# Patient Record
Sex: FEMALE | Employment: FULL TIME | URBAN - METROPOLITAN AREA
[De-identification: names, ages, dates, MRNs, and addresses within clinical notes are randomized per-mention and may not be internally consistent; named-entity substitution may affect disease eponyms.]

---

## 2021-09-19 ENCOUNTER — HOSPITAL ENCOUNTER (OUTPATIENT)
Facility: CLINIC | Age: 21
Setting detail: OBSERVATION
Discharge: HOME | End: 2021-09-20
Attending: EMERGENCY MEDICINE | Admitting: FAMILY MEDICINE

## 2021-09-19 ENCOUNTER — APPOINTMENT (OUTPATIENT)
Dept: ULTRASOUND IMAGING | Facility: CLINIC | Age: 21
End: 2021-09-19
Attending: EMERGENCY MEDICINE

## 2021-09-19 DIAGNOSIS — K80.50 BILIARY COLIC: ICD-10-CM

## 2021-09-19 LAB
ALBUMIN SERPL-MCNC: 3.8 G/DL (ref 3.4–5)
ALBUMIN UR-MCNC: NEGATIVE MG/DL
ALP SERPL-CCNC: 130 U/L (ref 40–150)
ALT SERPL W P-5'-P-CCNC: 248 U/L (ref 0–50)
ANION GAP SERPL CALCULATED.3IONS-SCNC: 7 MMOL/L (ref 3–14)
APPEARANCE UR: CLEAR
AST SERPL W P-5'-P-CCNC: 224 U/L (ref 0–45)
BACTERIA #/AREA URNS HPF: ABNORMAL /HPF
BASOPHILS # BLD MANUAL: 0.1 10E3/UL (ref 0–0.2)
BASOPHILS NFR BLD MANUAL: 1 %
BILIRUB SERPL-MCNC: 1.4 MG/DL (ref 0.2–1.3)
BILIRUB UR QL STRIP: NEGATIVE
BLISTER CELLS BLD QL SMEAR: ABNORMAL
BUN SERPL-MCNC: 19 MG/DL (ref 7–30)
CALCIUM SERPL-MCNC: 8.9 MG/DL (ref 8.5–10.1)
CHLORIDE BLD-SCNC: 106 MMOL/L (ref 94–109)
CO2 SERPL-SCNC: 24 MMOL/L (ref 20–32)
COLOR UR AUTO: YELLOW
CREAT SERPL-MCNC: 0.63 MG/DL (ref 0.52–1.04)
EOSINOPHIL # BLD MANUAL: 0 10E3/UL (ref 0–0.7)
EOSINOPHIL NFR BLD MANUAL: 0 %
ERYTHROCYTE [DISTWIDTH] IN BLOOD BY AUTOMATED COUNT: 12.8 % (ref 10–15)
GFR SERPL CREATININE-BSD FRML MDRD: >90 ML/MIN/1.73M2
GLUCOSE BLD-MCNC: 73 MG/DL (ref 70–99)
GLUCOSE UR STRIP-MCNC: NEGATIVE MG/DL
HCG SERPL QL: NEGATIVE
HCT VFR BLD AUTO: 37.3 % (ref 35–47)
HGB BLD-MCNC: 12.3 G/DL (ref 11.7–15.7)
HGB UR QL STRIP: NEGATIVE
KETONES UR STRIP-MCNC: 20 MG/DL
LEUKOCYTE ESTERASE UR QL STRIP: ABNORMAL
LIPASE SERPL-CCNC: 151 U/L (ref 73–393)
LYMPHOCYTES # BLD MANUAL: 2.6 10E3/UL (ref 0.8–5.3)
LYMPHOCYTES NFR BLD MANUAL: 30 %
MCH RBC QN AUTO: 29.6 PG (ref 26.5–33)
MCHC RBC AUTO-ENTMCNC: 33 G/DL (ref 31.5–36.5)
MCV RBC AUTO: 90 FL (ref 78–100)
MONOCYTES # BLD MANUAL: 0.5 10E3/UL (ref 0–1.3)
MONOCYTES NFR BLD MANUAL: 6 %
MUCOUS THREADS #/AREA URNS LPF: PRESENT /LPF
NEUTROPHILS # BLD MANUAL: 5.4 10E3/UL (ref 1.6–8.3)
NEUTROPHILS NFR BLD MANUAL: 63 %
NITRATE UR QL: NEGATIVE
PH UR STRIP: 5.5 [PH] (ref 5–7)
PLAT MORPH BLD: ABNORMAL
PLATELET # BLD AUTO: 288 10E3/UL (ref 150–450)
POTASSIUM BLD-SCNC: 3.6 MMOL/L (ref 3.4–5.3)
PROT SERPL-MCNC: 7.6 G/DL (ref 6.8–8.8)
RBC # BLD AUTO: 4.16 10E6/UL (ref 3.8–5.2)
RBC MORPH BLD: ABNORMAL
RBC URINE: 1 /HPF
SODIUM SERPL-SCNC: 137 MMOL/L (ref 133–144)
SP GR UR STRIP: 1.03 (ref 1–1.03)
SQUAMOUS EPITHELIAL: 9 /HPF
UROBILINOGEN UR STRIP-MCNC: 12 MG/DL
WBC # BLD AUTO: 8.5 10E3/UL (ref 4–11)
WBC URINE: 1 /HPF

## 2021-09-19 PROCEDURE — 250N000011 HC RX IP 250 OP 636: Performed by: EMERGENCY MEDICINE

## 2021-09-19 PROCEDURE — 85027 COMPLETE CBC AUTOMATED: CPT | Performed by: EMERGENCY MEDICINE

## 2021-09-19 PROCEDURE — G0378 HOSPITAL OBSERVATION PER HR: HCPCS

## 2021-09-19 PROCEDURE — 81001 URINALYSIS AUTO W/SCOPE: CPT | Performed by: EMERGENCY MEDICINE

## 2021-09-19 PROCEDURE — 84703 CHORIONIC GONADOTROPIN ASSAY: CPT | Performed by: EMERGENCY MEDICINE

## 2021-09-19 PROCEDURE — U0005 INFEC AGEN DETEC AMPLI PROBE: HCPCS | Performed by: EMERGENCY MEDICINE

## 2021-09-19 PROCEDURE — 258N000003 HC RX IP 258 OP 636: Performed by: EMERGENCY MEDICINE

## 2021-09-19 PROCEDURE — 96361 HYDRATE IV INFUSION ADD-ON: CPT | Performed by: EMERGENCY MEDICINE

## 2021-09-19 PROCEDURE — 99285 EMERGENCY DEPT VISIT HI MDM: CPT | Mod: 25 | Performed by: EMERGENCY MEDICINE

## 2021-09-19 PROCEDURE — 83690 ASSAY OF LIPASE: CPT | Performed by: EMERGENCY MEDICINE

## 2021-09-19 PROCEDURE — 82040 ASSAY OF SERUM ALBUMIN: CPT | Performed by: EMERGENCY MEDICINE

## 2021-09-19 PROCEDURE — 96374 THER/PROPH/DIAG INJ IV PUSH: CPT | Performed by: EMERGENCY MEDICINE

## 2021-09-19 PROCEDURE — 36415 COLL VENOUS BLD VENIPUNCTURE: CPT | Performed by: EMERGENCY MEDICINE

## 2021-09-19 PROCEDURE — 99285 EMERGENCY DEPT VISIT HI MDM: CPT | Performed by: EMERGENCY MEDICINE

## 2021-09-19 PROCEDURE — 76705 ECHO EXAM OF ABDOMEN: CPT

## 2021-09-19 RX ORDER — ONDANSETRON 4 MG/1
4 TABLET, ORALLY DISINTEGRATING ORAL EVERY 6 HOURS PRN
Qty: 10 TABLET | Refills: 0 | Status: SHIPPED | OUTPATIENT
Start: 2021-09-19 | End: 2021-09-22

## 2021-09-19 RX ORDER — SODIUM CHLORIDE 9 MG/ML
INJECTION, SOLUTION INTRAVENOUS CONTINUOUS
Status: DISCONTINUED | OUTPATIENT
Start: 2021-09-19 | End: 2021-09-20 | Stop reason: HOSPADM

## 2021-09-19 RX ORDER — ONDANSETRON 2 MG/ML
4 INJECTION INTRAMUSCULAR; INTRAVENOUS
Status: DISCONTINUED | OUTPATIENT
Start: 2021-09-19 | End: 2021-09-20 | Stop reason: HOSPADM

## 2021-09-19 RX ORDER — TRAMADOL HYDROCHLORIDE 50 MG/1
50-100 TABLET ORAL EVERY 6 HOURS PRN
Qty: 15 TABLET | Refills: 0 | Status: SHIPPED | OUTPATIENT
Start: 2021-09-19

## 2021-09-19 RX ORDER — HYDROMORPHONE HYDROCHLORIDE 1 MG/ML
0.5 INJECTION, SOLUTION INTRAMUSCULAR; INTRAVENOUS; SUBCUTANEOUS
Status: COMPLETED | OUTPATIENT
Start: 2021-09-19 | End: 2021-09-19

## 2021-09-19 RX ORDER — METOCLOPRAMIDE 10 MG/1
10 TABLET ORAL
COMMUNITY

## 2021-09-19 RX ADMIN — SODIUM CHLORIDE: 9 INJECTION, SOLUTION INTRAVENOUS at 21:31

## 2021-09-19 RX ADMIN — HYDROMORPHONE HYDROCHLORIDE 0.5 MG: 1 INJECTION, SOLUTION INTRAMUSCULAR; INTRAVENOUS; SUBCUTANEOUS at 20:00

## 2021-09-19 RX ADMIN — SODIUM CHLORIDE 1000 ML: 9 INJECTION, SOLUTION INTRAVENOUS at 19:58

## 2021-09-20 VITALS
SYSTOLIC BLOOD PRESSURE: 134 MMHG | RESPIRATION RATE: 18 BRPM | HEART RATE: 64 BPM | DIASTOLIC BLOOD PRESSURE: 85 MMHG | TEMPERATURE: 98.7 F | WEIGHT: 172.9 LBS | OXYGEN SATURATION: 100 %

## 2021-09-20 LAB — SARS-COV-2 RNA RESP QL NAA+PROBE: NEGATIVE

## 2021-09-20 NOTE — TELEPHONE ENCOUNTER
REFERRAL INFORMATION:    Referring Provider:  Dr. Reinaldo Mejia     Referring Clinic:  [facility]     Reason for Visit/Diagnosis: Biliary Colic        FUTURE VISIT INFORMATION:    Appointment Date: 9/22/2021    Appointment Time: 11 AM      NOTES RECORD STATUS  DETAILS   OFFICE NOTE from Referring Provider Internal 9/19/2021 ED visit    OFFICE NOTE from Other Specialists N/A    HOSPITAL DISCHARGE SUMMARY/ ED VISITS  N/A    OPERATIVE REPORT N/A    ENDOSCOPY (EGD)  N/A    PERTINENT LABS Internal    PATHOLOGY REPORTS (RELATED) N/A    IMAGING (CT, MRI, US, XR)  Internal US Abdomen: 9/19/2021

## 2021-09-20 NOTE — DISCHARGE INSTRUCTIONS
A referral has been placed into the computer system for you to be seen in the general surgery clinic to be evaluated to have your gallbladder taken out.  Please call them at Surgery - General Clinic(phone: [phone]) to schedule.    A referral has been placed into the computer system for you to be seen by our social service personnel to help you get signed up for medical insurance.  Please call them at [phone] initiate their services.    Return to the ER for worsening or fever.

## 2021-09-20 NOTE — ED PROVIDER NOTES
History     Chief Complaint   Patient presents with     Abdominal Pain     Galbladder pain, 6months ago.      HPI  Dilip Zavaleta is a 21 year old R[place]andan-speaking female who presents to the emergency department with complaints of bilateral upper quadrant abdominal pain that started this morning when she woke up.  Patient states she has had this pain before and was seen out at [facility] where she was diagnosed with gallbladder disease.  Patient states at that time she did not have an ultrasound or CT performed but was diagnosed through lab tests.  Patient denies any fevers currently but complains of nausea a couple episodes of slightly loose stools but no coughing or shortness of breath.    I have reviewed the Medications, Allergies, Past Medical and Surgical History, and Social History in the Epic system.    History reviewed. No pertinent past medical history.    History reviewed. No pertinent surgical history.        Review of your medicines      UNREVIEWED medicines. Ask your doctor about these medicines      Dose / Directions   metoclopramide 10 MG tablet  Commonly known as: REGLAN      Dose: 10 mg  Take 10 mg by mouth 4 times daily (before meals and nightly)  Refills: 0            Past medical history, past surgical history, medications, and allergies were reviewed with the patient. Additional pertinent items: None    No family history on file.    Social History     Tobacco Use     Smoking status: Current Every Day Smoker     Packs/day: 1.00     Types: Cigarettes     Smokeless tobacco: Never Used   Substance Use Topics     Alcohol use: Not Currently     Social history was reviewed with the patient. Additional pertinent items: None    No Known Allergies    Review of Systems  A complete review of systems was performed with pertinent positives and negatives noted in the HPI, and all other systems negative.    Physical Exam   BP: 129/81  Pulse: 76  Temp: 97.6  F (36.4  C)  Resp: 12  Weight: 78.4  kg (172 lb 14.4 oz)  SpO2: 100 %      Physical Exam  Vitals and nursing note reviewed.   Constitutional:       Appearance: She is not ill-appearing or diaphoretic.   HENT:      Head: Atraumatic.   Eyes:      Extraocular Movements: Extraocular movements intact.      Pupils: Pupils are equal, round, and reactive to light.   Abdominal:      Comments: There is tenderness in the right and left upper quadrants of the abdomen but more so on the right.  The lower quadrants are nontender and the abdomen is otherwise soft   Neurological:      General: No focal deficit present.      Mental Status: She is alert and oriented to person, place, and time.   Psychiatric:         Mood and Affect: Mood normal.         ED Course        Procedures         Results for orders placed or performed during the hospital encounter of 09/19/21 (from the past 24 hour(s))   CBC with platelets differential    Narrative    The following orders were created for panel order CBC with platelets differential.  Procedure                               Abnormality         Status                     ---------                               -----------         ------                     CBC with platelets and d...[[id number]]  Normal              Final result               Manual Differential[[id number]]          Abnormal            Final result                 Please view results for these tests on the individual orders.   Comprehensive metabolic panel   Result Value Ref Range    Sodium 137 133 - 144 mmol/L    Potassium 3.6 3.4 - 5.3 mmol/L    Chloride 106 94 - 109 mmol/L    Carbon Dioxide (CO2) 24 20 - 32 mmol/L    Anion Gap 7 3 - 14 mmol/L    Urea Nitrogen 19 7 - 30 mg/dL    Creatinine 0.63 0.52 - 1.04 mg/dL    Calcium 8.9 8.5 - 10.1 mg/dL    Glucose 73 70 - 99 mg/dL    Alkaline Phosphatase 130 40 - 150 U/L     (H) 0 - 45 U/L     (H) 0 - 50 U/L    Protein Total 7.6 6.8 - 8.8 g/dL    Albumin 3.8 3.4 - 5.0 g/dL    Bilirubin Total 1.4 (H) 0.2 - 1.3  mg/dL    GFR Estimate >90 >60 mL/min/1.73m2   Lipase   Result Value Ref Range    Lipase 151 73 - 393 U/L   HCG qualitative Blood   Result Value Ref Range    hCG Serum Qualitative Negative Negative   CBC with platelets and differential   Result Value Ref Range    WBC Count 8.5 4.0 - 11.0 10e3/uL    RBC Count 4.16 3.80 - 5.20 10e6/uL    Hemoglobin 12.3 11.7 - 15.7 g/dL    Hematocrit 37.3 35.0 - 47.0 %    MCV 90 78 - 100 fL    MCH 29.6 26.5 - 33.0 pg    MCHC 33.0 31.5 - 36.5 g/dL    RDW 12.8 10.0 - 15.0 %    Platelet Count 288 150 - 450 10e3/uL   Manual Differential   Result Value Ref Range    % Neutrophils 63 %    % Lymphocytes 30 %    % Monocytes 6 %    % Eosinophils 0 %    % Basophils 1 %    Absolute Neutrophils 5.4 1.6 - 8.3 10e3/uL    Absolute Lymphocytes 2.6 0.8 - 5.3 10e3/uL    Absolute Monocytes 0.5 0.0 - 1.3 10e3/uL    Absolute Eosinophils 0.0 0.0 - 0.7 10e3/uL    Absolute Basophils 0.1 0.0 - 0.2 10e3/uL    RBC Morphology Confirmed RBC Indices     Platelet Assessment  Automated Count Confirmed. Platelet morphology is normal.     Automated Count Confirmed. Platelet morphology is normal.    Blister Cells Moderate (A) None Seen   UA with Microscopic reflex to Culture    Specimen: Urine, Midstream   Result Value Ref Range    Color Urine Yellow Colorless, Straw, Light Yellow, Yellow    Appearance Urine Clear Clear    Glucose Urine Negative Negative mg/dL    Bilirubin Urine Negative Negative    Ketones Urine 20  (A) Negative mg/dL    Specific Gravity Urine 1.030 1.003 - 1.035    Blood Urine Negative Negative    pH Urine 5.5 5.0 - 7.0    Protein Albumin Urine Negative Negative mg/dL    Urobilinogen Urine 12.0 (A) Normal, 2.0 mg/dL    Nitrite Urine Negative Negative    Leukocyte Esterase Urine Trace (A) Negative    Bacteria Urine Few (A) None Seen /HPF    Mucus Urine Present (A) None Seen /LPF    RBC Urine 1 <=2 /HPF    WBC Urine 1 <=5 /HPF    Squamous Epithelials Urine 9 (H) <=1 /HPF    Narrative    Urine Culture not  indicated   Abdomen US, limited (RUQ only)    Narrative    EXAM: US ABDOMEN LIMITED  LOCATION: [facility]  DATE/TIME: 9/19/2021 8:05 PM    INDICATION: RUQ pain  COMPARISON: None.  TECHNIQUE: Limited abdominal ultrasound.    FINDINGS:    GALLBLADDER: Numerous gallstones identified within the gallbladder and the patient demonstrates tenderness L bladder with transducer pressure (positive sonographic Ochoa's sign). However, there is no inflammatory gallbladder wall thickening.    BILE DUCTS: No biliary dilatation. The common duct measures 3 mm.    LIVER: Normal parenchyma with smooth contour. No focal mass.    RIGHT KIDNEY: No hydronephrosis.    PANCREAS: The visualized portions are normal.    No ascites.      Impression    IMPRESSION:  1.  Cholelithiasis. Mild apparent tenderness over the gallbladder with transducer pressure during this exam.  2.  However, there is no inflammatory gallbladder wall thickening and no bile duct dilatation.         Orders Placed This Encounter   Procedures     Abdomen US, limited (RUQ only)     Comprehensive metabolic panel     Lipase     HCG qualitative Blood     UA with Microscopic reflex to Culture     CBC with platelets and differential     Manual Differential     Asymptomatic COVID-19 Virus (Coronavirus) by PCR     Peripheral IV: Standard     Surgery General Adult IP Consult: Patient to be seen: Routine within 24 hrs; Call back #: [phone]; recurrent biliary colic needs cholecystectomy; Consultant may enter orders: Yes; Requesting provider? Other supervising provider; Name: HONORIO Mejia, ...     Social Work IP Consult     [place] to Observation         Assessments & Plan (with Medical Decision Making)     I have reviewed the nursing notes.    Medications   sodium chloride 0.9% infusion ( Intravenous New Bag 9/19/21 4209)   ondansetron (ZOFRAN) injection 4 mg (has no administration in time range)   0.9% sodium chloride BOLUS (1,000 mLs  Intravenous New Bag 9/19/21 1958)   HYDROmorphone (PF) (DILAUDID) injection 0.5 mg (0.5 mg Intravenous Given 9/19/21 2000)      Patient presents to the ER with right upper quadrant abdominal pain and slightly elevated liver enzymes with no evidence of true cholecystitis by ultrasound.  Case discussed with general surgery staff who will have the team consult on the patient if placed in the ED observation unit on the [place] and consider offering surgery sooner rather than later to this patient.    I have reviewed the findings, diagnosis, and plan with the patient, her friend.  Patient understands and would like to proceed with surgical consultation and potential surgery sooner rather than later.        Final diagnoses:   Biliary colic     Reinaldo Mejia MD, MD    9/19/2021   [facility] EMERGENCY DEPARTMENT     Reinaldo Mejia MD  09/19/21 7707      Addendum ([id number]):  Patient initially was to be transferred to the ED observation unit for her abdominal pain and for surgical evaluation for cholecystectomy.  The patient changed her mind about the observation unit and decided to go home instead and follow-up as an outpatient.       Review of your medicines      UNREVIEWED medicines. Ask your doctor about these medicines      Dose / Directions   metoclopramide 10 MG tablet  Commonly known as: REGLAN      Dose: 10 mg  Take 10 mg by mouth 4 times daily (before meals and nightly)  Refills: 0        START taking      Dose / Directions   ondansetron 4 MG ODT tab  Commonly known as: Zofran ODT      Dose: 4 mg  Take 1 tablet (4 mg) by mouth every 6 hours as needed for nausea or vomiting  Quantity: 10 tablet  Refills: 0     traMADol 50 MG tablet  Commonly known as: Ultram      Dose:  mg  Take 1-2 tablets ( mg) by mouth every 6 hours as needed for pain  Quantity: 15 tablet  Refills: 0           Where to get your medicines      Some of these will need a paper prescription and others can  be bought over the counter. Ask your nurse if you have questions.    Bring a paper prescription for each of these medications    ondansetron 4 MG ODT tab    traMADol 50 MG tablet       Orders Placed This Encounter   Procedures     Abdomen US, limited (RUQ only)     Comprehensive metabolic panel     Lipase     HCG qualitative Blood     UA with Microscopic reflex to Culture     CBC with platelets and differential     Manual Differential     Asymptomatic COVID-19 Virus (Coronavirus) by PCR     Adult General Surg Referral     Social Work Referral ([facility]/P[facility]/MG ONLY)     Peripheral IV: Standard     A referral has been placed into the computer system for you to be seen in the general surgery clinic to be evaluated to have your gallbladder taken out.  Please call them at Surgery - General Clinic(phone: [phone]) to schedule.    A referral has been placed into the computer system for you to be seen by our social service personnel to help you get signed up for medical insurance.  Please call them at [phone] initiate their services.    Return to the ER for worsening or fever.      Routine discharge instructions were given in Salvadorean for this diagnosis    Reinaldo Mejia MD, MD Mejia, Reinaldo Abbott MD  09/19/21 1595

## 2021-09-22 ENCOUNTER — PRE VISIT (OUTPATIENT)
Dept: SURGERY | Facility: CLINIC | Age: 21
End: 2021-09-22

## 2021-12-09 ENCOUNTER — APPOINTMENT (OUTPATIENT)
Dept: ULTRASOUND IMAGING | Facility: CLINIC | Age: 21
End: 2021-12-09
Attending: EMERGENCY MEDICINE

## 2021-12-09 ENCOUNTER — HOSPITAL ENCOUNTER (EMERGENCY)
Facility: CLINIC | Age: 21
Discharge: HOME | End: 2021-12-10
Attending: EMERGENCY MEDICINE | Admitting: EMERGENCY MEDICINE

## 2021-12-09 DIAGNOSIS — K80.20 CALCULUS OF GALLBLADDER WITHOUT CHOLECYSTITIS WITHOUT OBSTRUCTION: ICD-10-CM

## 2021-12-09 LAB
ALBUMIN SERPL-MCNC: 3.7 G/DL (ref 3.4–5)
ALBUMIN UR-MCNC: 10 MG/DL
ALP SERPL-CCNC: 72 U/L (ref 40–150)
ALT SERPL W P-5'-P-CCNC: 29 U/L (ref 0–50)
ANION GAP SERPL CALCULATED.3IONS-SCNC: 4 MMOL/L (ref 3–14)
APPEARANCE UR: CLEAR
AST SERPL W P-5'-P-CCNC: 26 U/L (ref 0–45)
BASOPHILS # BLD MANUAL: 0.1 10E3/UL (ref 0–0.2)
BASOPHILS NFR BLD MANUAL: 1 %
BILIRUB SERPL-MCNC: 0.4 MG/DL (ref 0.2–1.3)
BILIRUB UR QL STRIP: NEGATIVE
BUN SERPL-MCNC: 15 MG/DL (ref 7–30)
CALCIUM SERPL-MCNC: 9 MG/DL (ref 8.5–10.1)
CHLORIDE BLD-SCNC: 109 MMOL/L (ref 94–109)
CO2 SERPL-SCNC: 25 MMOL/L (ref 20–32)
COLOR UR AUTO: ABNORMAL
CREAT SERPL-MCNC: 0.48 MG/DL (ref 0.52–1.04)
EOSINOPHIL # BLD MANUAL: 0.6 10E3/UL (ref 0–0.7)
EOSINOPHIL NFR BLD MANUAL: 6 %
ERYTHROCYTE [DISTWIDTH] IN BLOOD BY AUTOMATED COUNT: 13 % (ref 10–15)
GFR SERPL CREATININE-BSD FRML MDRD: >90 ML/MIN/1.73M2
GLUCOSE BLD-MCNC: 65 MG/DL (ref 70–99)
GLUCOSE UR STRIP-MCNC: NEGATIVE MG/DL
HCG UR QL: NEGATIVE
HCT VFR BLD AUTO: 37.9 % (ref 35–47)
HGB BLD-MCNC: 12.8 G/DL (ref 11.7–15.7)
HGB UR QL STRIP: NEGATIVE
INTERNAL QC OK POCT: NORMAL
KETONES UR STRIP-MCNC: NEGATIVE MG/DL
LEUKOCYTE ESTERASE UR QL STRIP: NEGATIVE
LIPASE SERPL-CCNC: 153 U/L (ref 73–393)
LYMPHOCYTES # BLD MANUAL: 3.6 10E3/UL (ref 0.8–5.3)
LYMPHOCYTES NFR BLD MANUAL: 37 %
MCH RBC QN AUTO: 29.9 PG (ref 26.5–33)
MCHC RBC AUTO-ENTMCNC: 33.8 G/DL (ref 31.5–36.5)
MCV RBC AUTO: 89 FL (ref 78–100)
MONOCYTES # BLD MANUAL: 0.6 10E3/UL (ref 0–1.3)
MONOCYTES NFR BLD MANUAL: 6 %
MUCOUS THREADS #/AREA URNS LPF: PRESENT /LPF
NEUTROPHILS # BLD MANUAL: 4.8 10E3/UL (ref 1.6–8.3)
NEUTROPHILS NFR BLD MANUAL: 50 %
NITRATE UR QL: NEGATIVE
PH UR STRIP: 6.5 [PH] (ref 5–7)
PLAT MORPH BLD: NORMAL
PLATELET # BLD AUTO: 251 10E3/UL (ref 150–450)
POTASSIUM BLD-SCNC: 4 MMOL/L (ref 3.4–5.3)
PROT SERPL-MCNC: 7.9 G/DL (ref 6.8–8.8)
RBC # BLD AUTO: 4.28 10E6/UL (ref 3.8–5.2)
RBC MORPH BLD: NORMAL
RBC URINE: 1 /HPF
SODIUM SERPL-SCNC: 138 MMOL/L (ref 133–144)
SP GR UR STRIP: 1.03 (ref 1–1.03)
SQUAMOUS EPITHELIAL: 6 /HPF
UROBILINOGEN UR STRIP-MCNC: NORMAL MG/DL
WBC # BLD AUTO: 9.6 10E3/UL (ref 4–11)
WBC URINE: 1 /HPF

## 2021-12-09 PROCEDURE — 36415 COLL VENOUS BLD VENIPUNCTURE: CPT | Performed by: EMERGENCY MEDICINE

## 2021-12-09 PROCEDURE — 99284 EMERGENCY DEPT VISIT MOD MDM: CPT | Performed by: EMERGENCY MEDICINE

## 2021-12-09 PROCEDURE — 81025 URINE PREGNANCY TEST: CPT | Performed by: EMERGENCY MEDICINE

## 2021-12-09 PROCEDURE — 96361 HYDRATE IV INFUSION ADD-ON: CPT | Performed by: EMERGENCY MEDICINE

## 2021-12-09 PROCEDURE — 258N000003 HC RX IP 258 OP 636: Performed by: EMERGENCY MEDICINE

## 2021-12-09 PROCEDURE — 80053 COMPREHEN METABOLIC PANEL: CPT | Performed by: EMERGENCY MEDICINE

## 2021-12-09 PROCEDURE — 76705 ECHO EXAM OF ABDOMEN: CPT

## 2021-12-09 PROCEDURE — 99284 EMERGENCY DEPT VISIT MOD MDM: CPT | Mod: 25 | Performed by: EMERGENCY MEDICINE

## 2021-12-09 PROCEDURE — 96374 THER/PROPH/DIAG INJ IV PUSH: CPT | Performed by: EMERGENCY MEDICINE

## 2021-12-09 PROCEDURE — 83690 ASSAY OF LIPASE: CPT | Performed by: EMERGENCY MEDICINE

## 2021-12-09 PROCEDURE — 250N000011 HC RX IP 250 OP 636: Performed by: EMERGENCY MEDICINE

## 2021-12-09 PROCEDURE — 81001 URINALYSIS AUTO W/SCOPE: CPT | Performed by: EMERGENCY MEDICINE

## 2021-12-09 PROCEDURE — 85027 COMPLETE CBC AUTOMATED: CPT | Performed by: EMERGENCY MEDICINE

## 2021-12-09 RX ORDER — SODIUM CHLORIDE 9 MG/ML
INJECTION, SOLUTION INTRAVENOUS CONTINUOUS
Status: DISCONTINUED | OUTPATIENT
Start: 2021-12-09 | End: 2021-12-10 | Stop reason: HOSPADM

## 2021-12-09 RX ORDER — TRAMADOL HYDROCHLORIDE 50 MG/1
50-100 TABLET ORAL EVERY 6 HOURS PRN
Qty: 15 TABLET | Refills: 0 | Status: SHIPPED | OUTPATIENT
Start: 2021-12-09

## 2021-12-09 RX ORDER — KETOROLAC TROMETHAMINE 30 MG/ML
30 INJECTION, SOLUTION INTRAMUSCULAR; INTRAVENOUS ONCE
Status: COMPLETED | OUTPATIENT
Start: 2021-12-09 | End: 2021-12-09

## 2021-12-09 RX ADMIN — KETOROLAC TROMETHAMINE 30 MG: 30 INJECTION, SOLUTION INTRAMUSCULAR at 22:09

## 2021-12-09 RX ADMIN — SODIUM CHLORIDE 1000 ML: 9 INJECTION, SOLUTION INTRAVENOUS at 22:07

## 2021-12-09 NOTE — LETTER
December 9, 2021      To Whom It May Concern:      Dilip Zavaleta was seen in our Emergency Department today, 12/09/21.  I expect her condition to improve over the next 1-2 days.  She may return to work/school when improved.    Sincerely,        Ramirez Ramirez MD

## 2021-12-10 VITALS
HEART RATE: 60 BPM | OXYGEN SATURATION: 100 % | DIASTOLIC BLOOD PRESSURE: 67 MMHG | RESPIRATION RATE: 16 BRPM | TEMPERATURE: 98.5 F | SYSTOLIC BLOOD PRESSURE: 124 MMHG | WEIGHT: 170 LBS

## 2021-12-10 NOTE — ED PROVIDER NOTES
[place] EMERGENCY DEPARTMENT ([facility])  12/09/21 ED  History     Chief Complaint   Patient presents with     Abdominal Pain     right upper quadrant pain, intermittent, current episode stated today, has history of gallbladder stones, surgery was recommended     HPI  Dilip Zavaleta is an otherwise healthy 21 year old female who presents to the emergency department for evaluation of RUQ abdominal pain.  Patient reports intermittent right upper quadrant abdominal pain, current episode starting today.  Patient has a history of gallstones.  She states that she has been having the problem for off and on for 6 months.  It is worse with fatty foods and meats.  She had meat earlier this week.  2 days of diarrhea Monday and Tuesday but no further episodes.  Her pain is located in the right upper quadrant.  Denies any nausea or vomiting.  Denies any fevers or chills.  She reports slight dysuria.  She has not followed up with any general surgeon.  She is concerned about frequent attacks causing her to miss work.    Past Medical History  History reviewed. No pertinent past medical history.  History reviewed. No pertinent surgical history.  metoclopramide (REGLAN) 10 MG tablet  traMADol (ULTRAM) 50 MG tablet      No Known Allergies  Family History  History reviewed. No pertinent family history.  Social History   Social History     Tobacco Use     Smoking status: Current Every Day Smoker     Packs/day: 1.00     Types: Cigarettes     Smokeless tobacco: Never Used   Substance Use Topics     Alcohol use: Not Currently     Drug use: Not Currently      Past medical history, past surgical history, medications, allergies, family history, and social history were reviewed with the patient. No additional pertinent items.       Review of Systems  A complete review of systems was performed with pertinent positives and negatives noted in the HPI, and all other systems negative.    Physical Exam   BP: 112/68  Pulse:  64  Temp: 98.5  F (36.9  C)  Resp: 16  Weight: 77.1 kg (170 lb)  SpO2: 99 %  Physical Exam  Constitutional:       General: She is not in acute distress.  HENT:      Head: Normocephalic and atraumatic.   Eyes:      Extraocular Movements: Extraocular movements intact.   Cardiovascular:      Rate and Rhythm: Normal rate and regular rhythm.      Heart sounds: Normal heart sounds.   Pulmonary:      Effort: Pulmonary effort is normal.      Breath sounds: Normal breath sounds.   Abdominal:      General: Abdomen is flat.      Palpations: Abdomen is soft.      Tenderness: There is abdominal tenderness in the right upper quadrant. There is no guarding or rebound.   Skin:     General: Skin is warm.   Neurological:      General: No focal deficit present.      Mental Status: She is alert and oriented to person, place, and time.         ED Course      Procedures          Results for orders placed or performed during the hospital encounter of 12/09/21   US Abdomen Limited (RUQ)     Status: None    Narrative    EXAM: US ABDOMEN LIMITED  LOCATION: [facility]  DATE/TIME: 12/9/2021 10:17 PM    INDICATION: Right upper quadrant pain.  COMPARISON: 9/19/2021.  TECHNIQUE: Limited abdominal ultrasound.    FINDINGS:    GALLBLADDER: Multiple stones in the gallbladder. The gallbladder otherwise appears normal. No sonographic Ochoa sign.    BILE DUCTS: No biliary dilatation. The common duct measures 2 mm.    LIVER: Normal parenchyma with smooth contour. No focal mass.    RIGHT KIDNEY: No hydronephrosis.    PANCREAS: The visualized portions are normal.    No ascites.      Impression    IMPRESSION:  1.  Cholelithiasis. There is no biliary dilatation or evidence of cholecystitis.       Comprehensive metabolic panel     Status: Abnormal   Result Value Ref Range    Sodium 138 133 - 144 mmol/L    Potassium 4.0 3.4 - 5.3 mmol/L    Chloride 109 94 - 109 mmol/L    Carbon Dioxide (CO2) 25 20 - 32 mmol/L     Anion Gap 4 3 - 14 mmol/L    Urea Nitrogen 15 7 - 30 mg/dL    Creatinine 0.48 (L) 0.52 - 1.04 mg/dL    Calcium 9.0 8.5 - 10.1 mg/dL    Glucose 65 (L) 70 - 99 mg/dL    Alkaline Phosphatase 72 40 - 150 U/L    AST 26 0 - 45 U/L    ALT 29 0 - 50 U/L    Protein Total 7.9 6.8 - 8.8 g/dL    Albumin 3.7 3.4 - 5.0 g/dL    Bilirubin Total 0.4 0.2 - 1.3 mg/dL    GFR Estimate >90 >60 mL/min/1.73m2   Lipase     Status: Normal   Result Value Ref Range    Lipase 153 73 - 393 U/L   UA with Microscopic reflex to Culture     Status: Abnormal    Specimen: Urine, NOS   Result Value Ref Range    Color Urine Light Yellow Colorless, Straw, Light Yellow, Yellow    Appearance Urine Clear Clear    Glucose Urine Negative Negative mg/dL    Bilirubin Urine Negative Negative    Ketones Urine Negative Negative mg/dL    Specific Gravity Urine 1.029 1.003 - 1.035    Blood Urine Negative Negative    pH Urine 6.5 5.0 - 7.0    Protein Albumin Urine 10  (A) Negative mg/dL    Urobilinogen Urine Normal Normal, 2.0 mg/dL    Nitrite Urine Negative Negative    Leukocyte Esterase Urine Negative Negative    Mucus Urine Present (A) None Seen /LPF    RBC Urine 1 <=2 /HPF    WBC Urine 1 <=5 /HPF    Squamous Epithelials Urine 6 (H) <=1 /HPF    Narrative    Urine Culture not indicated   CBC with platelets and differential     Status: Normal   Result Value Ref Range    WBC Count 9.6 4.0 - 11.0 10e3/uL    RBC Count 4.28 3.80 - 5.20 10e6/uL    Hemoglobin 12.8 11.7 - 15.7 g/dL    Hematocrit 37.9 35.0 - 47.0 %    MCV 89 78 - 100 fL    MCH 29.9 26.5 - 33.0 pg    MCHC 33.8 31.5 - 36.5 g/dL    RDW 13.0 10.0 - 15.0 %    Platelet Count 251 150 - 450 10e3/uL   Manual Differential     Status: None   Result Value Ref Range    % Neutrophils 50 %    % Lymphocytes 37 %    % Monocytes 6 %    % Eosinophils 6 %    % Basophils 1 %    Absolute Neutrophils 4.8 1.6 - 8.3 10e3/uL    Absolute Lymphocytes 3.6 0.8 - 5.3 10e3/uL    Absolute Monocytes 0.6 0.0 - 1.3 10e3/uL    Absolute  Eosinophils 0.6 0.0 - 0.7 10e3/uL    Absolute Basophils 0.1 0.0 - 0.2 10e3/uL    RBC Morphology Confirmed RBC Indices     Platelet Assessment  Automated Count Confirmed. Platelet morphology is normal.     Automated Count Confirmed. Platelet morphology is normal.   hCG qual urine POCT     Status: Normal   Result Value Ref Range    HCG Qual Urine Negative Negative    Internal QC Check POCT Valid Valid   CBC with platelets differential     Status: None    Narrative    The following orders were created for panel order CBC with platelets differential.  Procedure                               Abnormality         Status                     ---------                               -----------         ------                     CBC with platelets and d...[[id number]]  Normal              Final result               Manual Differential[[id number]]                              Final result                 Please view results for these tests on the individual orders.     Medications   0.9% sodium chloride BOLUS (1,000 mLs Intravenous New Bag 12/9/21 2207)     Followed by   sodium chloride 0.9% infusion (has no administration in time range)   ketorolac (TORADOL) injection 30 mg (30 mg Intravenous Given 12/9/21 2209)        Assessments & Plan (with Medical Decision Making)    was used throughout the visit.  Patient presented for right upper quadrant pain.  She has known gallstones and has been to the ER several times for this over the past several months.  She reports that she has been having issues for 6 months.  Her acute reason for presentation was increased pain over the last day.  She thinks eating meat earlier this week may have caused this.  She has tenderness in the right upper quadrant benign exam vitals are stable.  She was treated symptomatically pain had improved.  Laboratory work is benign no increase in LFTs or lipase.  Ultrasound shows her stones but no signs for cholecystitis.  I discussed with the patient  that since this seems to aggravate her quite frequently and is causing her to miss work she should follow-up with general surgery to see about possible elective removal.  General surgery referral will be placed and she will also be given the clinic number for follow-up.  Advised her to follow-up there and discussed signs and symptoms to return in the interim such as developing fevers or uncontrolled pain.  Patient stable for discharge and outpatient follow-up.    I have reviewed the nursing notes. I have reviewed the findings, diagnosis, plan and need for follow up with the patient.    New Prescriptions    No medications on file       Final diagnoses:   Calculus of gallbladder without cholecystitis without obstruction       --  Ramirez Ramirez MD  [facility] EMERGENCY DEPARTMENT  12/9/2021     Ramirez Ramirez MD  12/09/21 4914

## 2021-12-10 NOTE — DISCHARGE INSTRUCTIONS
Please make an appointment to follow up with Your Primary Care Provider, [facility] (phone: [phone]), and Surgery - General Clinic(phone: [phone]) as soon as possible.

## 2022-02-23 ENCOUNTER — APPOINTMENT (OUTPATIENT)
Dept: CT IMAGING | Facility: CLINIC | Age: 22
End: 2022-02-23
Attending: EMERGENCY MEDICINE

## 2022-02-23 ENCOUNTER — HOSPITAL ENCOUNTER (EMERGENCY)
Facility: CLINIC | Age: 22
Discharge: HOME | End: 2022-02-23
Attending: EMERGENCY MEDICINE | Admitting: EMERGENCY MEDICINE

## 2022-02-23 VITALS
TEMPERATURE: 98.2 F | RESPIRATION RATE: 18 BRPM | HEART RATE: 70 BPM | BODY MASS INDEX: 29.02 KG/M2 | OXYGEN SATURATION: 99 % | DIASTOLIC BLOOD PRESSURE: 84 MMHG | SYSTOLIC BLOOD PRESSURE: 125 MMHG | HEIGHT: 64 IN | WEIGHT: 170 LBS
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
## 2022-02-23 DIAGNOSIS — S06.0X9A CONCUSSION WITH LOSS OF CONSCIOUSNESS, INITIAL ENCOUNTER: ICD-10-CM

## 2022-02-23 DIAGNOSIS — S02.2XXA CLOSED FRACTURE OF NASAL BONE, INITIAL ENCOUNTER: ICD-10-CM

## 2022-02-23 PROCEDURE — 70486 CT MAXILLOFACIAL W/O DYE: CPT | Mod: 26 | Performed by: STUDENT IN AN ORGANIZED HEALTH CARE EDUCATION/TRAINING PROGRAM

## 2022-02-23 PROCEDURE — 70450 CT HEAD/BRAIN W/O DYE: CPT | Mod: 26 | Performed by: RADIOLOGY

## 2022-02-23 PROCEDURE — 70450 CT HEAD/BRAIN W/O DYE: CPT

## 2022-02-23 PROCEDURE — 99284 EMERGENCY DEPT VISIT MOD MDM: CPT | Mod: 25 | Performed by: EMERGENCY MEDICINE

## 2022-02-23 PROCEDURE — 70486 CT MAXILLOFACIAL W/O DYE: CPT

## 2022-02-23 PROCEDURE — 99284 EMERGENCY DEPT VISIT MOD MDM: CPT | Performed by: EMERGENCY MEDICINE

## 2022-02-23 ASSESSMENT — ENCOUNTER SYMPTOMS
NAUSEA: 0
FACIAL SWELLING: 1
HEADACHES: 0
COLOR CHANGE: 1
VOMITING: 0

## 2022-02-23 ASSESSMENT — VISUAL ACUITY
OD: 20/40
OS: 20/40

## 2022-02-23 NOTE — DISCHARGE INSTRUCTIONS
Please make an appointment to follow up with [facility] (phone: [phone]) in 7-10 days as needed.    Please make an appointment to follow up with Your Primary Care Provider in 3-5 days as needed for signs of a concussion.      Enlist someone to supervise you closely for the next 48 hours.  Return to the ED for worsening headache, behavior changes, confusion, or recurrent vomiting.     Limit exposure to light, noise, and physical or cognitive activity if this causes headache, nausea, or increased irritability.     You may return to normal activity if you do not have any symptoms of a concussion in the next 48 hours.     Follow up with primary care before resuming normal activity, if you do develop symptoms of a concussion. Please read the attached handout for further instructions on monitoring your symptoms.
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
## 2022-03-05 ENCOUNTER — HOSPITAL ENCOUNTER (EMERGENCY)
Facility: CLINIC | Age: 22
Discharge: HOME | End: 2022-03-05
Attending: EMERGENCY MEDICINE | Admitting: EMERGENCY MEDICINE

## 2022-03-05 ENCOUNTER — APPOINTMENT (OUTPATIENT)
Dept: GENERAL RADIOLOGY | Facility: CLINIC | Age: 22
End: 2022-03-05
Attending: EMERGENCY MEDICINE

## 2022-03-05 VITALS
SYSTOLIC BLOOD PRESSURE: 118 MMHG | RESPIRATION RATE: 12 BRPM | WEIGHT: 163.2 LBS | HEART RATE: 63 BPM | TEMPERATURE: 97.9 F | OXYGEN SATURATION: 98 % | DIASTOLIC BLOOD PRESSURE: 68 MMHG | BODY MASS INDEX: 28.01 KG/M2

## 2022-03-05 DIAGNOSIS — R07.89 ATYPICAL CHEST PAIN: ICD-10-CM

## 2022-03-05 DIAGNOSIS — T43.641A: ICD-10-CM

## 2022-03-05 LAB
ANION GAP SERPL CALCULATED.3IONS-SCNC: 6 MMOL/L (ref 3–14)
BASOPHILS # BLD MANUAL: 0 10E3/UL (ref 0–0.2)
BASOPHILS NFR BLD MANUAL: 0 %
BUN SERPL-MCNC: 7 MG/DL (ref 7–30)
CALCIUM SERPL-MCNC: 9.3 MG/DL (ref 8.5–10.1)
CHLORIDE BLD-SCNC: 105 MMOL/L (ref 94–109)
CO2 SERPL-SCNC: 25 MMOL/L (ref 20–32)
CREAT SERPL-MCNC: 0.56 MG/DL (ref 0.52–1.04)
EOSINOPHIL # BLD MANUAL: 0.1 10E3/UL (ref 0–0.7)
EOSINOPHIL NFR BLD MANUAL: 1 %
ERYTHROCYTE [DISTWIDTH] IN BLOOD BY AUTOMATED COUNT: 12.5 % (ref 10–15)
GFR SERPL CREATININE-BSD FRML MDRD: >90 ML/MIN/1.73M2
GLUCOSE BLD-MCNC: 77 MG/DL (ref 70–99)
HCG SERPL QL: NEGATIVE
HCT VFR BLD AUTO: 40.3 % (ref 35–47)
HGB BLD-MCNC: 13.6 G/DL (ref 11.7–15.7)
LYMPHOCYTES # BLD MANUAL: 2.5 10E3/UL (ref 0.8–5.3)
LYMPHOCYTES NFR BLD MANUAL: 24 %
MCH RBC QN AUTO: 29.8 PG (ref 26.5–33)
MCHC RBC AUTO-ENTMCNC: 33.7 G/DL (ref 31.5–36.5)
MCV RBC AUTO: 88 FL (ref 78–100)
MONOCYTES # BLD MANUAL: 0.6 10E3/UL (ref 0–1.3)
MONOCYTES NFR BLD MANUAL: 6 %
NEUTROPHILS # BLD MANUAL: 7.3 10E3/UL (ref 1.6–8.3)
NEUTROPHILS NFR BLD MANUAL: 69 %
PLAT MORPH BLD: NORMAL
PLATELET # BLD AUTO: 292 10E3/UL (ref 150–450)
POTASSIUM BLD-SCNC: 3.3 MMOL/L (ref 3.4–5.3)
RBC # BLD AUTO: 4.57 10E6/UL (ref 3.8–5.2)
RBC MORPH BLD: NORMAL
SODIUM SERPL-SCNC: 136 MMOL/L (ref 133–144)
TROPONIN I SERPL HS-MCNC: 4 NG/L
WBC # BLD AUTO: 10.6 10E3/UL (ref 4–11)

## 2022-03-05 PROCEDURE — 93005 ELECTROCARDIOGRAM TRACING: CPT | Performed by: EMERGENCY MEDICINE

## 2022-03-05 PROCEDURE — 99285 EMERGENCY DEPT VISIT HI MDM: CPT | Mod: 25 | Performed by: EMERGENCY MEDICINE

## 2022-03-05 PROCEDURE — 36415 COLL VENOUS BLD VENIPUNCTURE: CPT | Performed by: EMERGENCY MEDICINE

## 2022-03-05 PROCEDURE — 80048 BASIC METABOLIC PNL TOTAL CA: CPT | Performed by: EMERGENCY MEDICINE

## 2022-03-05 PROCEDURE — 84703 CHORIONIC GONADOTROPIN ASSAY: CPT | Performed by: EMERGENCY MEDICINE

## 2022-03-05 PROCEDURE — 71046 X-RAY EXAM CHEST 2 VIEWS: CPT

## 2022-03-05 PROCEDURE — 84484 ASSAY OF TROPONIN QUANT: CPT | Performed by: EMERGENCY MEDICINE

## 2022-03-05 PROCEDURE — 93308 TTE F-UP OR LMTD: CPT | Mod: 26 | Performed by: EMERGENCY MEDICINE

## 2022-03-05 PROCEDURE — 93010 ELECTROCARDIOGRAM REPORT: CPT | Mod: 59 | Performed by: EMERGENCY MEDICINE

## 2022-03-05 PROCEDURE — 93308 TTE F-UP OR LMTD: CPT | Performed by: EMERGENCY MEDICINE

## 2022-03-05 PROCEDURE — 85027 COMPLETE CBC AUTOMATED: CPT | Performed by: EMERGENCY MEDICINE

## 2022-03-05 NOTE — ED PROVIDER NOTES
History     Chief Complaint   Patient presents with     Chest Pain     Patient c/o chest pain after drug use for 2 weeks. Patient taking tablets that have cocaine in them.      HPI  Dilip Zavaleta is a 21 year old female with PMH notable for biliary colic who presents to the ED with chest pain.  [place] iPad  used.  Patient reports symptom onset about 5 hours ago.  Patient reports that she has been taking a supplement that people at her job provides her which is a derivative of cocaine (earl(sp?) - patient later stated it was MDMA).  She did not feel in the usual amount of energy from it today so she took an extra dose in the afternoon.  She then began feeling tightness in her chest with intermittent mild shortness of breath.  No recent cough or fever.  No history of DVT/PE, no leg pain or swelling.  Chest pain has been constant since its onset.  No recent vomiting but has been somewhat nauseous.  She endorses a mild chronic abdominal pain that she attributes to her gallbladder which is not substantially changed from his baseline.     Past Medical History  No past medical history on file.  No past surgical history on file.  metoclopramide (REGLAN) 10 MG tablet  traMADol (ULTRAM) 50 MG tablet  traMADol (ULTRAM) 50 MG tablet      No Known Allergies  Social History   Social History     Tobacco Use     Smoking status: Current Every Day Smoker     Packs/day: 1.00     Types: Cigarettes     Smokeless tobacco: Never Used   Substance Use Topics     Alcohol use: Not Currently     Drug use: Not Currently      Past medical history and social history were reviewed with the patient. Additional pertinent items: None     Review of Systems  A complete review of systems was performed with pertinent positives and negatives noted in the HPI, and all other systems negative.    Physical Exam   BP: (!) 161/92  Pulse: 87  Temp: 98.7  F (37.1  C)  Resp: 16  Weight: 74 kg (163 lb 3.2 oz)  SpO2: 100 %    Physical  Exam  General: no acute distress. Appears stated age.   HENT: MMM, no oropharyngeal lesions  Eyes: PERRL, normal sclerae  Cardio: regular rate. Regular rhythm. Extremities well perfused  Resp: Normal work of breathing, normal respiratory rate.  Abdomen: mild diffuse tenderness, non-distended, no rebound, no guarding  Neuro: alert and fully oriented. CN II-XII grossly intact. Grossly normal strength and sensation in all extremities.   MSK: no deformities. Grossly normal ROM in extremities. No leg swelling.   Integumentary/Skin: no rash visualized, normal color  Psych: normal affect, normal behavior    ED Course      Procedures  Results for orders placed during the hospital encounter of 03/05/22    POC US ECHO LIMITED    Impression  Limited Bedside ED Cardiac Ultrasound  PROCEDURE: PERFORMED BY: Dr. Ian Yin MD  INDICATIONS/SYMPTOM:  Chest Pain  PROBE: Cardiac phased array probe  BODY LOCATION: Chest (cardiac)  FINDINGS: The ultrasound was performed utilizing the subcostal, parasternal long axis, parasternal short axis and apical 4 chamber views.  Grossly normal LV contractility. No RV dilation visualized. No pericardial effusion visualized. IVC grossly collapsed.  INTERPRETATION:    Grossly normal LV contractility. No pericardial effusion. No RV dilation. IVC size and variability consistent with hypovolemia.  IMAGE DOCUMENTATION: Images were archived to PACs system.            EKG Interpretation:      Interpreted by Ian Yin MD  Time reviewed: 0050  Symptoms at time of EKG: chest pain    Rhythm: normal sinus   Rate: Normal  Axis: Normal  Ectopy: none  Conduction: normal  ST Segments/ T Waves: No ST-T wave changes and No acute ischemic changes  Q Waves: very small inferior Q waves  Comparison to prior: No old EKG available    Clinical Impression: normal EKG     Labs Ordered and Resulted from Time of ED Arrival to Time of ED Departure   BASIC METABOLIC PANEL - Abnormal       Result Value    Sodium  136      Potassium 3.3 (*)     Chloride 105      Carbon Dioxide (CO2) 25      Anion Gap 6      Urea Nitrogen 7      Creatinine 0.56      Calcium 9.3      Glucose 77      GFR Estimate >90     TROPONIN I - Normal    Troponin I High Sensitivity 4     HCG QUALITATIVE PREGNANCY - Normal    hCG Serum Qualitative Negative     CBC WITH PLATELETS AND DIFFERENTIAL - Normal    WBC Count 10.6      RBC Count 4.57      Hemoglobin 13.6      Hematocrit 40.3      MCV 88      MCH 29.8      MCHC 33.7      RDW 12.5      Platelet Count 292     DIFFERENTIAL    % Neutrophils 69      % Lymphocytes 24      % Monocytes 6      % Eosinophils 1      % Basophils 0      Absolute Neutrophils 7.3      Absolute Lymphocytes 2.5      Absolute Monocytes 0.6      Absolute Eosinophils 0.1      Absolute Basophils 0.0      RBC Morphology Confirmed RBC Indices      Platelet Assessment        Value: Automated Count Confirmed. Platelet morphology is normal.     XR Chest 2 Views   Final Result   IMPRESSION: Negative chest.      POC US ECHO LIMITED   Final Result   Limited Bedside ED Cardiac Ultrasound   PROCEDURE: PERFORMED BY: Dr. Ian Yin MD   INDICATIONS/SYMPTOM:  Chest Pain   PROBE: Cardiac phased array probe   BODY LOCATION: Chest (cardiac)   FINDINGS: The ultrasound was performed utilizing the subcostal, parasternal long axis, parasternal short axis and apical 4 chamber views.   Grossly normal LV contractility. No RV dilation visualized. No pericardial effusion visualized. IVC grossly collapsed.    INTERPRETATION:    Grossly normal LV contractility. No pericardial effusion. No RV dilation. IVC size and variability consistent with hypovolemia.    IMAGE DOCUMENTATION: Images were archived to PACs system.                Assessments & Plan (with Medical Decision Making)   Patient presenting with chest pain in the context of recent supplement use that is reportedly related to cocaine. Vitals in the ED unremarkable. Nursing notes reviewed.     ECG  shows normal sinus rhythm without evidence of acute ischemia, high-sensitivity troponin negative - ACS very unlikely. VTE risk factor profile very low, PERC met - PE very unlikely. Character and severity of pain less severe than would be expected for aortic dissection. Bedside cardiac US demonstrates grossly  LV contractility, no RV dilation, no pericardial effusion. Lack of ECG findings and lack of effusion makes pericarditis very unlikely. CXR without evidence of pneumonia, pneumothorax, pleural effusion, nor other visualized pathology.      Patient's symptoms resolved while in the emergency department.  Patient showing no signs of intoxication.    The complete clinical picture is most consistent with atypical chest pain likely related to MDMA use.  Patient counseled to discontinue use of that substance. After counseling on the diagnosis, work-up, and treatment plan, the patient was discharged to home. The patient was advised to follow-up with primary care in a few days. The patient was advised to return to the ED if worsening symptoms, or if there are any urgent/life-threatening concerns.     Final diagnoses:   Atypical chest pain   Unintentional poisoning by methylenedioxymethamphetamine (MDMA), initial encounter (H)     Discharge Medication List as of 3/5/2022  4:24 AM          --  Ian Yin MD   Emergency Medicine   [facility] EMERGENCY DEPARTMENT  3/5/2022     Ian Yin MD  03/05/22 0630

## 2022-03-05 NOTE — DISCHARGE INSTRUCTIONS
Instructions from your doctor today:  Emergency Department testing is focused on the potential causes of your symptoms that are the most dangerous possibilities, and cannot cover every possibility. Based on the evaluation, it was deemed sufficiently safe to discharge and continue management through the clinics. Thus, follow-up is very important to assess for improvement/worsening, potential further testing, and potential treatment adjustments. If you were given opioid pain medications or other medications that can make you drowsy while in the ED, you should not drive for at least several hours and not until you feel completely back to normal.     Please make an appointment to follow up with:  - Your Primary Care Provider in 3-7 days if any ongoing concerns  - If you do not have a primary care provider, you can be seen in follow-up and establish care by calling any of the clinics below:     - [facility] (phone: [phone])     - Primary Care / [facility] (phone: [phone])   - Have your clinic provider review the results from today's visit with you again, including any potential follow-up or additional testing that may be needed based on the results. Occasionally, incidental findings are found on later review by radiologists that may need follow-up.     Return to the Emergency Department immediately if you have worsening symptoms, or any other urgent or potentially life-threatening concerns.

## 2022-03-09 LAB
ATRIAL RATE - MUSE: 77 BPM
DIASTOLIC BLOOD PRESSURE - MUSE: NORMAL MMHG
INTERPRETATION ECG - MUSE: NORMAL
P AXIS - MUSE: 48 DEGREES
PR INTERVAL - MUSE: 172 MS
QRS DURATION - MUSE: 90 MS
QT - MUSE: 404 MS
QTC - MUSE: 457 MS
R AXIS - MUSE: 62 DEGREES
SYSTOLIC BLOOD PRESSURE - MUSE: NORMAL MMHG
T AXIS - MUSE: 42 DEGREES
VENTRICULAR RATE- MUSE: 77 BPM

## 2022-03-13 ENCOUNTER — HOSPITAL ENCOUNTER (EMERGENCY)
Facility: CLINIC | Age: 22
Discharge: HOME | End: 2022-03-13
Attending: EMERGENCY MEDICINE | Admitting: EMERGENCY MEDICINE

## 2022-03-13 VITALS
RESPIRATION RATE: 18 BRPM | WEIGHT: 170 LBS | SYSTOLIC BLOOD PRESSURE: 136 MMHG | HEIGHT: 64 IN | DIASTOLIC BLOOD PRESSURE: 71 MMHG | OXYGEN SATURATION: 100 % | BODY MASS INDEX: 29.02 KG/M2 | HEART RATE: 104 BPM

## 2022-03-13 DIAGNOSIS — R10.13 ABDOMINAL PAIN, EPIGASTRIC: ICD-10-CM

## 2022-03-13 LAB
ALBUMIN SERPL-MCNC: 3.4 G/DL (ref 3.4–5)
ALP SERPL-CCNC: 88 U/L (ref 40–150)
ALT SERPL W P-5'-P-CCNC: 46 U/L (ref 0–50)
ANION GAP SERPL CALCULATED.3IONS-SCNC: 2 MMOL/L (ref 3–14)
AST SERPL W P-5'-P-CCNC: 63 U/L (ref 0–45)
BASOPHILS # BLD AUTO: 0 10E3/UL (ref 0–0.2)
BASOPHILS NFR BLD AUTO: 0 %
BILIRUB SERPL-MCNC: 0.3 MG/DL (ref 0.2–1.3)
BUN SERPL-MCNC: 10 MG/DL (ref 7–30)
CALCIUM SERPL-MCNC: 9.6 MG/DL (ref 8.5–10.1)
CHLORIDE BLD-SCNC: 106 MMOL/L (ref 94–109)
CO2 SERPL-SCNC: 30 MMOL/L (ref 20–32)
CREAT SERPL-MCNC: 0.54 MG/DL (ref 0.52–1.04)
EOSINOPHIL # BLD AUTO: 0.2 10E3/UL (ref 0–0.7)
EOSINOPHIL NFR BLD AUTO: 2 %
ERYTHROCYTE [DISTWIDTH] IN BLOOD BY AUTOMATED COUNT: 12.8 % (ref 10–15)
GFR SERPL CREATININE-BSD FRML MDRD: >90 ML/MIN/1.73M2
GLUCOSE BLD-MCNC: 88 MG/DL (ref 70–99)
HCT VFR BLD AUTO: 41.6 % (ref 35–47)
HGB BLD-MCNC: 13.5 G/DL (ref 11.7–15.7)
IMM GRANULOCYTES # BLD: 0.1 10E3/UL
IMM GRANULOCYTES NFR BLD: 0 %
LIPASE SERPL-CCNC: 127 U/L (ref 73–393)
LYMPHOCYTES # BLD AUTO: 2.7 10E3/UL (ref 0.8–5.3)
LYMPHOCYTES NFR BLD AUTO: 19 %
MCH RBC QN AUTO: 29.4 PG (ref 26.5–33)
MCHC RBC AUTO-ENTMCNC: 32.5 G/DL (ref 31.5–36.5)
MCV RBC AUTO: 91 FL (ref 78–100)
MONOCYTES # BLD AUTO: 1.3 10E3/UL (ref 0–1.3)
MONOCYTES NFR BLD AUTO: 10 %
NEUTROPHILS # BLD AUTO: 9.5 10E3/UL (ref 1.6–8.3)
NEUTROPHILS NFR BLD AUTO: 69 %
NRBC # BLD AUTO: 0 10E3/UL
NRBC BLD AUTO-RTO: 0 /100
PLATELET # BLD AUTO: 309 10E3/UL (ref 150–450)
POTASSIUM BLD-SCNC: 3.6 MMOL/L (ref 3.4–5.3)
PROT SERPL-MCNC: 7.6 G/DL (ref 6.8–8.8)
RBC # BLD AUTO: 4.59 10E6/UL (ref 3.8–5.2)
SODIUM SERPL-SCNC: 138 MMOL/L (ref 133–144)
WBC # BLD AUTO: 13.8 10E3/UL (ref 4–11)

## 2022-03-13 PROCEDURE — 83690 ASSAY OF LIPASE: CPT | Performed by: EMERGENCY MEDICINE

## 2022-03-13 PROCEDURE — 96374 THER/PROPH/DIAG INJ IV PUSH: CPT | Performed by: EMERGENCY MEDICINE

## 2022-03-13 PROCEDURE — 99284 EMERGENCY DEPT VISIT MOD MDM: CPT | Performed by: EMERGENCY MEDICINE

## 2022-03-13 PROCEDURE — 36415 COLL VENOUS BLD VENIPUNCTURE: CPT | Performed by: EMERGENCY MEDICINE

## 2022-03-13 PROCEDURE — 250N000013 HC RX MED GY IP 250 OP 250 PS 637: Performed by: EMERGENCY MEDICINE

## 2022-03-13 PROCEDURE — 96375 TX/PRO/DX INJ NEW DRUG ADDON: CPT | Performed by: EMERGENCY MEDICINE

## 2022-03-13 PROCEDURE — 250N000011 HC RX IP 250 OP 636: Performed by: EMERGENCY MEDICINE

## 2022-03-13 PROCEDURE — 250N000009 HC RX 250: Performed by: EMERGENCY MEDICINE

## 2022-03-13 PROCEDURE — 99284 EMERGENCY DEPT VISIT MOD MDM: CPT | Mod: 25 | Performed by: EMERGENCY MEDICINE

## 2022-03-13 PROCEDURE — 80053 COMPREHEN METABOLIC PANEL: CPT | Performed by: EMERGENCY MEDICINE

## 2022-03-13 PROCEDURE — 85004 AUTOMATED DIFF WBC COUNT: CPT | Performed by: EMERGENCY MEDICINE

## 2022-03-13 RX ORDER — FAMOTIDINE 20 MG/1
20 TABLET, FILM COATED ORAL 2 TIMES DAILY
Qty: 20 TABLET | Refills: 0 | Status: SHIPPED | OUTPATIENT
Start: 2022-03-13

## 2022-03-13 RX ORDER — ONDANSETRON 2 MG/ML
8 INJECTION INTRAMUSCULAR; INTRAVENOUS ONCE
Status: COMPLETED | OUTPATIENT
Start: 2022-03-13 | End: 2022-03-13

## 2022-03-13 RX ADMIN — FAMOTIDINE 20 MG: 10 INJECTION, SOLUTION INTRAVENOUS at 04:24

## 2022-03-13 RX ADMIN — ONDANSETRON 8 MG: 2 INJECTION INTRAMUSCULAR; INTRAVENOUS at 04:25

## 2022-03-13 RX ADMIN — LIDOCAINE HYDROCHLORIDE 30 ML: 20 SOLUTION ORAL; TOPICAL at 05:00

## 2022-03-13 NOTE — ED PROVIDER NOTES
"ED Provider Note  [facility]      History     Chief Complaint   Patient presents with     Abdominal Pain     HPI  Dilip Zavaleta is a 21 year old female with history of gallstones presents to the ED with complaint of diffuse upper abdominal pain, worse in the epigastrium, with associated nausea and one episode of vomiting.  She states she felt fine when she went to bed.  Her roommate went out drinking tonight, when he got home they checked on the patient, woke her up.  Within a few minutes she told them that she needed to go to the hospital because she was having pain.  She states that she noticed the pain for the first time when they woke her up, which was shortly prior to arrival.  She states the pain is crampy.  She states she thinks that she had similar pain with gallstones before.  No diarrhea.  No fever, cough, shortness of breath.  She was not drinking tonight.    Past Medical History  No past medical history on file.  No past surgical history on file.  famotidine (PEPCID) 20 MG tablet  metoclopramide (REGLAN) 10 MG tablet  traMADol (ULTRAM) 50 MG tablet  traMADol (ULTRAM) 50 MG tablet      No Known Allergies  Family History  No family history on file.  Social History   Social History     Tobacco Use     Smoking status: Current Every Day Smoker     Packs/day: 1.00     Types: Cigarettes     Smokeless tobacco: Never Used   Substance Use Topics     Alcohol use: Not Currently     Drug use: Not Currently      Past medical history, past surgical history, medications, allergies, family history, and social history were reviewed with the patient. No additional pertinent items.       Review of Systems  A complete review of systems was performed with pertinent positives and negatives noted in the HPI, and all other systems negative.    Physical Exam   BP: 136/71  Pulse: 104  Resp: 18  Height: 162.6 cm (5' 4\")  Weight: 77.1 kg (170 lb)  SpO2: 100 %  Physical Exam  Constitutional:       " General: She is in acute distress (appears uncomfortable).      Appearance: She is not diaphoretic.   HENT:      Head: Atraumatic.   Eyes:      General: No scleral icterus.  Cardiovascular:      Heart sounds: Normal heart sounds.   Pulmonary:      Effort: No respiratory distress.      Breath sounds: Normal breath sounds.   Abdominal:      Palpations: Abdomen is soft.      Tenderness: There is abdominal tenderness.       Musculoskeletal:         General: No tenderness.   Skin:     General: Skin is warm.      Findings: No rash.         ED Course      Procedures                     Results for orders placed or performed during the hospital encounter of 03/13/22   Lipase     Status: Normal   Result Value Ref Range    Lipase 127 73 - 393 U/L   Comprehensive metabolic panel     Status: Abnormal   Result Value Ref Range    Sodium 138 133 - 144 mmol/L    Potassium 3.6 3.4 - 5.3 mmol/L    Chloride 106 94 - 109 mmol/L    Carbon Dioxide (CO2) 30 20 - 32 mmol/L    Anion Gap 2 (L) 3 - 14 mmol/L    Urea Nitrogen 10 7 - 30 mg/dL    Creatinine 0.54 0.52 - 1.04 mg/dL    Calcium 9.6 8.5 - 10.1 mg/dL    Glucose 88 70 - 99 mg/dL    Alkaline Phosphatase 88 40 - 150 U/L    AST 63 (H) 0 - 45 U/L    ALT 46 0 - 50 U/L    Protein Total 7.6 6.8 - 8.8 g/dL    Albumin 3.4 3.4 - 5.0 g/dL    Bilirubin Total 0.3 0.2 - 1.3 mg/dL    GFR Estimate >90 >60 mL/min/1.73m2   CBC with platelets and differential     Status: Abnormal   Result Value Ref Range    WBC Count 13.8 (H) 4.0 - 11.0 10e3/uL    RBC Count 4.59 3.80 - 5.20 10e6/uL    Hemoglobin 13.5 11.7 - 15.7 g/dL    Hematocrit 41.6 35.0 - 47.0 %    MCV 91 78 - 100 fL    MCH 29.4 26.5 - 33.0 pg    MCHC 32.5 31.5 - 36.5 g/dL    RDW 12.8 10.0 - 15.0 %    Platelet Count 309 150 - 450 10e3/uL    % Neutrophils 69 %    % Lymphocytes 19 %    % Monocytes 10 %    % Eosinophils 2 %    % Basophils 0 %    % Immature Granulocytes 0 %    NRBCs per 100 WBC 0 <1 /100    Absolute Neutrophils 9.5 (H) 1.6 - 8.3 10e3/uL     Absolute Lymphocytes 2.7 0.8 - 5.3 10e3/uL    Absolute Monocytes 1.3 0.0 - 1.3 10e3/uL    Absolute Eosinophils 0.2 0.0 - 0.7 10e3/uL    Absolute Basophils 0.0 0.0 - 0.2 10e3/uL    Absolute Immature Granulocytes 0.1 <=0.4 10e3/uL    Absolute NRBCs 0.0 10e3/uL   CBC with platelets differential     Status: Abnormal    Narrative    The following orders were created for panel order CBC with platelets differential.  Procedure                               Abnormality         Status                     ---------                               -----------         ------                     CBC with platelets and d...[[id number]]  Abnormal            Final result                 Please view results for these tests on the individual orders.     Medications   ondansetron (ZOFRAN) injection 8 mg (8 mg Intravenous Given 3/13/22 0425)   famotidine (PEPCID) injection 20 mg (20 mg Intravenous Given 3/13/22 0424)   lidocaine (viscous) (XYLOCAINE) 2 % 15 mL, alum & mag hydroxide-simethicone (MAALOX) 15 mL GI Cocktail (30 mLs Oral Given 3/13/22 0500)        Assessments & Plan (with Medical Decision Making)   Although the patient does have some right upper quadrant and left upper quadrant pain, the majority of her pain and tenderness is in the epigastrium.  I did order labs, give Zofran, famotidine, GI cocktail.  On repeat evaluation her pain is completely gone as is her nausea.  Lipase was normal.  CBC mildly elevated at 13.8, which is nonspecific.  CMP reveals a very mildly elevated AST, otherwise unremarkable.  On repeat abdominal exam she has no tenderness in the right upper quadrant.  I do not think today's episode represents acute cholecystitis given that her pain is completely gone and she is nontender on exam.  This seems much more likely representative of a gastritis/esophageal reflux.  However, I did discuss with the patient, via an , that she is likely to have gallbladder issues in the future, and that its  strongly recommended that she follow-up with surgeon as an outpatient.  She additionally admitted to me subsequently that she used oxycodone earlier in the day to get high.  This was in the afternoon.  I told her I do not think that is related to what happened tonight, however, I strongly advised her to abstain from drugs as they certainly are harmful to her body.  I will give a prescription for famotidine.  She is encouraged to follow-up as above, return to the ER with any new or worsening symptoms, any other concerns.  She verbalizes understanding and is agreeable to the plan.    Dictation Disclaimer: Some of this Note has been completed with voice-recognition dictation software. Although errors are generally corrected real-time, there is the potential for a rare error to be present in the completed chart.      I have reviewed the nursing notes. I have reviewed the findings, diagnosis, plan and need for follow up with the patient.    Discharge Medication List as of 3/13/2022  5:46 AM      START taking these medications    Details   famotidine (PEPCID) 20 MG tablet Take 1 tablet (20 mg) by mouth 2 times daily, Disp-20 tablet, R-0, Local Print             Final diagnoses:   Abdominal pain, epigastric       --  Marysol Gaines  [facility] EMERGENCY DEPARTMENT  3/13/2022     Marysol Gaines MD  03/13/22 0516

## 2022-03-13 NOTE — DISCHARGE INSTRUCTIONS
Use the medication as prescribed. Follow up with a surgeon. Return with any concerns.    Please make an appointment to follow up with Surgery - [facility](phone: [phone]) in 1-3 weeks.

## 2022-03-13 NOTE — ED TRIAGE NOTES
Patient arrives to triage with coworker from home.  Patient has a strangle-like pain on her right side that started at 0300.  VSS  Has had this pain before, it was from her gallbladder.   Patient vomited on floor in triage room.

## 2022-03-14 ENCOUNTER — HOSPITAL ENCOUNTER (EMERGENCY)
Facility: CLINIC | Age: 22
Discharge: HOME | End: 2022-03-14
Attending: EMERGENCY MEDICINE | Admitting: EMERGENCY MEDICINE

## 2022-03-14 VITALS
DIASTOLIC BLOOD PRESSURE: 86 MMHG | TEMPERATURE: 98.4 F | SYSTOLIC BLOOD PRESSURE: 155 MMHG | RESPIRATION RATE: 16 BRPM | HEART RATE: 82 BPM | OXYGEN SATURATION: 100 %

## 2022-03-14 DIAGNOSIS — N63.0 BREAST MASS: ICD-10-CM

## 2022-03-14 PROCEDURE — 250N000013 HC RX MED GY IP 250 OP 250 PS 637: Performed by: EMERGENCY MEDICINE

## 2022-03-14 PROCEDURE — 99284 EMERGENCY DEPT VISIT MOD MDM: CPT | Performed by: EMERGENCY MEDICINE

## 2022-03-14 PROCEDURE — 99283 EMERGENCY DEPT VISIT LOW MDM: CPT | Performed by: EMERGENCY MEDICINE

## 2022-03-14 RX ORDER — OXYCODONE HYDROCHLORIDE 5 MG/1
5 TABLET ORAL EVERY 6 HOURS PRN
Qty: 12 TABLET | Refills: 0 | Status: SHIPPED | OUTPATIENT
Start: 2022-03-14 | End: 2022-03-17

## 2022-03-14 RX ORDER — CEPHALEXIN 500 MG/1
500 CAPSULE ORAL 3 TIMES DAILY
Qty: 30 CAPSULE | Refills: 0 | Status: SHIPPED | OUTPATIENT
Start: 2022-03-14 | End: 2022-03-24

## 2022-03-14 RX ORDER — OXYCODONE HYDROCHLORIDE 5 MG/1
5 TABLET ORAL ONCE
Status: COMPLETED | OUTPATIENT
Start: 2022-03-14 | End: 2022-03-14

## 2022-03-14 RX ORDER — CEPHALEXIN 500 MG/1
500 CAPSULE ORAL ONCE
Status: COMPLETED | OUTPATIENT
Start: 2022-03-14 | End: 2022-03-14

## 2022-03-14 RX ADMIN — OXYCODONE HYDROCHLORIDE 5 MG: 5 TABLET ORAL at 21:34

## 2022-03-14 RX ADMIN — CEPHALEXIN 500 MG: 500 CAPSULE ORAL at 21:34

## 2022-03-14 ASSESSMENT — ENCOUNTER SYMPTOMS
ARTHRALGIAS: 0
CHILLS: 0
COLOR CHANGE: 1
ABDOMINAL PAIN: 0
DIFFICULTY URINATING: 0
SORE THROAT: 0
VOMITING: 0
PALPITATIONS: 0
EYE REDNESS: 0
NECK STIFFNESS: 0
NAUSEA: 0
COUGH: 0
HEADACHES: 0
SHORTNESS OF BREATH: 0
CONFUSION: 0
DIARRHEA: 0
FEVER: 0
DIAPHORESIS: 1

## 2022-03-15 ENCOUNTER — APPOINTMENT (OUTPATIENT)
Dept: INTERPRETER SERVICES | Facility: CLINIC | Age: 22
End: 2022-03-15

## 2022-03-15 NOTE — DISCHARGE INSTRUCTIONS
You have been seen in the ER for a painful mass in the right breast.  There are different things that this could be, including an infection/abscess, a cyst, or a mass.  You need to have further testing including a mammogram and a breast ultrasound to find out what this is and what the next steps are. It is CRITICAL that you follow up for this.  We have placed a referral in the computer to establish care with a primary care clinic - expect a phone call in the next few days to schedule this. Please answer this call - it will likely be from an unfamiliar phone number.  In case you don't hear from them, call the number below to make an appointment as soon as possible.    We are giving you a prescription for antibiotics in case this is due to an infection. We are also giving you a prescription for a small number of oxycodone for pain.  Do not drink alcohol or drive while you are taking this medicine as it can be sedating.  You can also use over the counter tylenol 500-1000 mg every 6 hours as needed for pain and ibuprofen 800 mg every 8 hours as needed for pain.    Primary Care - [facility] (phone: [phone]). If you do not hear from someone to make an appointment in the next few days, call this number to make an appointment.  This is EXTREMELY IMPORTANT.

## 2022-03-15 NOTE — ED PROVIDER NOTES
ED Provider Note  [facility]      History     Chief Complaint   Patient presents with     Wound Infection     HPI     iPad  was used for this visit.    Dilip Zavaleta is a 21 year old female w/ PMH of cholelithiasis, who presents to the Emergency Department for R breast pain and concern for breast cellulitis/infection.  The patient reports that she has had pain in the right breast for about 1 week.  3 days ago she noticed a small area of erythema on the underside of the right breast.  She placed some VapoRub on the breast as she states she has done this anytime she has some minor skin irritation/redness and this has worked for her.  However, over the past 3 days the area of redness has spread and it has become much more painful for her.  She denies any wound or drainage from the breast.  No nipple discharge.  Last menstrual period was last week and she denies possibility of pregnancy.  No recent pregnancy or delivery.  She is not breast-feeding.    She does report quite a bit of pain associated with this to the point where she has trouble lying down and has had trouble sleeping the past few days.  She has tried Tylenol without relief of pain.  She denies any fever but has noted some chills and sweats.  Denies nasal congestion or sore throat.  No cough, no shortness of breath.  No current abdominal pain, nausea, vomiting, or diarrhea.  Interestingly she was seen in our ED yesterday after presenting with epigastric pain and one episode of vomiting after drinking.  She had labs done which showed some mild leukocytosis with a white count of 13.8.  She was told that she likely had biliary colic/gallstones which she has had before.  No imaging was done as she was nontender on exam yesterday.    Patient reports that she does smoke cigarettes, she drinks approximately every 3 weeks.  On Saturday, 2 days ago, she did try some recreational MDMA and states that a friend gave her  some oxycodone because she was having such discomfort with her breast.  She denies previously having issues with oxycodone or opiates or drug abuse.  She states she has never used any drugs at all prior to Saturday 2 days ago.    She reports she has never had any problems with her breasts in the past.  There is a family history of breast cancer in a grandmother, but she denies any first-degree relatives with breast cancer.    No past medical history on file.    No past surgical history on file.    No family history on file.    Social History     Tobacco Use     Smoking status: Current Every Day Smoker     Packs/day: 1.00     Types: Cigarettes     Smokeless tobacco: Never Used   Substance Use Topics     Alcohol use: Not Currently       Past Medical History  No past medical history on file.  No past surgical history on file.  cephALEXin (KEFLEX) 500 MG capsule  oxyCODONE (ROXICODONE) 5 MG tablet  famotidine (PEPCID) 20 MG tablet  metoclopramide (REGLAN) 10 MG tablet  traMADol (ULTRAM) 50 MG tablet  traMADol (ULTRAM) 50 MG tablet      No Known Allergies  Family History  No family history on file.  Social History   Social History     Tobacco Use     Smoking status: Current Every Day Smoker     Packs/day: 1.00     Types: Cigarettes     Smokeless tobacco: Never Used   Substance Use Topics     Alcohol use: Not Currently     Drug use: Not Currently      Past medical history, past surgical history, medications, allergies, family history, and social history were reviewed with the patient. No additional pertinent items.       Review of Systems   Constitutional: Positive for diaphoresis. Negative for chills and fever.   HENT: Negative for congestion and sore throat.    Eyes: Negative for redness.   Respiratory: Negative for cough and shortness of breath.    Cardiovascular: Negative for chest pain and palpitations.        R breast pain   Gastrointestinal: Negative for abdominal pain, diarrhea, nausea and vomiting.    Genitourinary: Negative for difficulty urinating.   Musculoskeletal: Negative for arthralgias and neck stiffness.   Skin: Positive for color change.   Neurological: Negative for headaches.   Psychiatric/Behavioral: Negative for confusion.   All other systems reviewed and are negative.    A complete review of systems was performed with pertinent positives and negatives noted in the HPI, and all other systems negative.    Physical Exam   BP: 131/85  Pulse: 86  Temp: 98.4  F (36.9  C)  Resp: 16  SpO2: 100 %  Physical Exam  Vitals and nursing note reviewed.   Constitutional:       General: She is in acute distress.      Appearance: She is well-developed. She is not diaphoretic.      Comments: Young adult female, sitting up in bed.  Anxious and distressed, having extreme difficulty lying flat due to pain.   HENT:      Head: Normocephalic and atraumatic.      Mouth/Throat:      Mouth: Mucous membranes are moist.      Pharynx: No oropharyngeal exudate.   Eyes:      Conjunctiva/sclera: Conjunctivae normal.      Pupils: Pupils are equal, round, and reactive to light.   Cardiovascular:      Rate and Rhythm: Normal rate and regular rhythm.      Heart sounds: Normal heart sounds.   Pulmonary:      Effort: Pulmonary effort is normal. No respiratory distress.      Breath sounds: Normal breath sounds. No wheezing or rales.   Chest:      Chest wall: Mass, swelling, tenderness and edema present.      Comments: Right breast: Large pendulous breasts at baseline.  There is erythema and extreme tenderness to palpation along the medial and inferior aspect of the right breast at about the 5 o'clock position.  No open wound or drainage visualized.  There is a very hard tender mass which is approximately the size of a tennis ball which extends from the 2:00 to the 5:00 position.  This is exquisitely tender to palpation and the patient is having quite a bit of difficulty tolerating an exam.  This hard area appears to be quite large and  does not appear to be fluctuant. No nipple discharge, no nipple inversion, no tenderness along the lateral aspect of the breast.  No axillary masses or nodes palpable.  Abdominal:      General: Bowel sounds are normal. There is no distension.      Palpations: Abdomen is soft.      Tenderness: There is no abdominal tenderness.   Skin:     General: Skin is warm and dry.   Neurological:      Mental Status: She is alert and oriented to person, place, and time.   Psychiatric:      Comments: Anxious, irritable         ED Course      Procedures       The medical record was reviewed and interpreted.              No results found for any visits on 03/14/22.  Medications   cephALEXin (KEFLEX) capsule 500 mg (500 mg Oral Given 3/14/22 2134)   oxyCODONE (ROXICODONE) tablet 5 mg (5 mg Oral Given 3/14/22 2134)        Assessments & Plan (with Medical Decision Making)   Patient presents to the emergency department today for the above complaints.  On my evaluation she is extraordinarily tender to palpation and there is erythema and a hard mass which is approximately the size of a small tennis ball in the right breast.  Differential diagnosis could include abscess/infection (which I think is unlikely) though there is overlying erythema which could be a cellulitis/mastitis.  As she is not lactating/breast-feeding it would be unusual to have an abscess in a nonlactating female.  Cyst would be possible, but I am more strongly concerned about the possibility of breast cancer/mass.    Patient had quite a bit of difficulty tolerating even a cursory exam.    I explained to the patient in detail through the iPad  that she absolutely requires further evaluation including likely with mammogram and breast ultrasound which I am not able to arrange from the emergency department.  This is definitely something her clinic can help her with.  I did explain that we are not able to definitively tell her what is causing her symptoms, but if  this is indeed due to a mass, breast cancer is in the differential.  I did explain that we cannot tell her at this point that she does have cancer, but she certainly requires additional evaluation including imaging and potential for biopsy if something is found on imaging.  Her clinic needs to help her arrange outpatient evaluation. She currently does not have a clinic and I have placed a referral for primary care in the computer.  I have also given her the phone number to [place] clinic.  Hopefully she will receive a phone call from [place] in the next few days to schedule her with the soonest available clinic appointment.  However if she does not hear from them she needs to call herself for an appointment ASAP.  Phone number given.  I did give her a prescription for Keflex as there is a small possibility that this is all infectious in etiology given the overlying erythema.  I also gave her a prescription for a limited number of oxycodone, typical precautions discussed.  I did have a detailed discussion with her about her drug use, she states the first time she ever used any drugs was 2 days ago when she used MDMA and oxycodone which was given to her by a friend because she was in such pain.  Denies any issues with drugs and denies any prior use of oxycodone or other opiates.  I will somewhat reluctantly give her a small course of oxycodone and I did discuss with her the typical precautions.  It is crucial that she follow-up for this.  Patient verbalizes understanding.    Patient was given 1 dose of Keflex and 1 dose of oxycodone here in the emergency department.  Encouraged to follow-up.    I have reviewed the nursing notes. I have reviewed the findings, diagnosis, plan and need for follow up with the patient.    Discharge Medication List as of 3/14/2022  9:45 PM      START taking these medications    Details   cephALEXin (KEFLEX) 500 MG capsule Take 1 capsule (500 mg) by mouth 3 times daily for  10 days, Disp-30 capsule, R-0, Local Print      oxyCODONE (ROXICODONE) 5 MG tablet Take 1 tablet (5 mg) by mouth every 6 hours as needed for pain, Disp-12 tablet, R-0, Local Print             Final diagnoses:   Breast mass       --  Qi Lui MD   [facility] EMERGENCY DEPARTMENT  3/14/2022     Qi Lui MD  03/14/22 4436

## 2022-03-22 ENCOUNTER — HOSPITAL ENCOUNTER (EMERGENCY)
Facility: CLINIC | Age: 22
Discharge: HOME | End: 2022-03-22
Attending: EMERGENCY MEDICINE | Admitting: EMERGENCY MEDICINE

## 2022-03-22 VITALS
HEART RATE: 64 BPM | DIASTOLIC BLOOD PRESSURE: 63 MMHG | SYSTOLIC BLOOD PRESSURE: 133 MMHG | TEMPERATURE: 98.5 F | OXYGEN SATURATION: 99 % | RESPIRATION RATE: 18 BRPM

## 2022-03-22 DIAGNOSIS — N61.1 BREAST ABSCESS: ICD-10-CM

## 2022-03-22 PROCEDURE — 99284 EMERGENCY DEPT VISIT MOD MDM: CPT | Performed by: EMERGENCY MEDICINE

## 2022-03-22 PROCEDURE — 99283 EMERGENCY DEPT VISIT LOW MDM: CPT | Performed by: EMERGENCY MEDICINE

## 2022-03-22 PROCEDURE — 87070 CULTURE OTHR SPECIMN AEROBIC: CPT | Performed by: EMERGENCY MEDICINE

## 2022-03-22 ASSESSMENT — ENCOUNTER SYMPTOMS
FEVER: 0
WOUND: 1

## 2022-03-22 NOTE — ED TRIAGE NOTES
Patient has an abscess under right breast. Patient has been taking antibiotics for 1 week and today abscess broke open and has been draining yellow and red pus

## 2022-03-22 NOTE — DISCHARGE INSTRUCTIONS
Expect a call from [facility] tomorrow to schedule mamogram and ultrasound as soon as possible.     Keep your appointment at [facility] on Thursday.     A wound culture is in process. We will call you if the culture grows bacteria resistant to your current antibiotics.     Soak the wound in a warm bath or shower once daily and squeeze the area to get puss out, then dry the area and cover with gauze.     Return to the ER if developing fevers over 100.4F, dizziness, or shaking chills.

## 2022-03-22 NOTE — ED PROVIDER NOTES
"    [place] EMERGENCY DEPARTMENT ([facility])  3/22/22  History     Chief Complaint   Patient presents with     Wound Infection     Patient was diagnosed with an abscess 1 week ago below right breast. Patient has been taking antibiotic as prescribed. Patient woke up today and reports abscess exploding and continuing to drain.      The history is provided by the patient and medical records.     Dilip Zavaleta is a 21 year old female with a history notable for cholelithiasis who returns to the ED for evaluation of right breast pain and infection. Patient states she was here on 3/14 for right breast pain, where she was told to follow up 3/17 for further testing. Patient states she did follow up (no record) but now the mass has \"exploded\". The wound is now draining yellow and red fluid. No fevers. No allergies to medications or other allergies. No other wounds noted. No other significant medical problems.     Per review of Care Everywhere, patient was evaluated in the [facility] ED on 3/14/2022 for concern of right breast pain and infection.  At that time patient reportedly had been dealing with the right breast pain for 1 week.  Then 3 days before presenting to the ED she noticed an area of erythema under her right breast and continued to spread and grow larger.  Patient was administered a dose of Keflex 500 mg in the ED and was prescribed a 10-day course 3 times daily (end date 3/24/2022).    I have reviewed the Medications, Allergies, Past Medical and Surgical History, and Social History in the [facility] system.  PAST MEDICAL HISTORY: History reviewed. No pertinent past medical history.    PAST SURGICAL HISTORY: History reviewed. No pertinent surgical history.    Past medical history, past surgical history, medications, and allergies were reviewed with the patient. Additional pertinent items: None    FAMILY HISTORY: History reviewed. No pertinent family history.    SOCIAL HISTORY:   Social History     Tobacco Use     " Smoking status: Current Every Day Smoker     Packs/day: 1.00     Types: Cigarettes     Smokeless tobacco: Never Used   Substance Use Topics     Alcohol use: Yes     Comment: socially     Social history was reviewed with the patient. Additional pertinent items: None      Discharge Medication List as of 3/22/2022  6:55 PM      CONTINUE these medications which have NOT CHANGED    Details   cephALEXin (KEFLEX) 500 MG capsule Take 1 capsule (500 mg) by mouth 3 times daily for 10 days, Disp-30 capsule, R-0, Local Print      famotidine (PEPCID) 20 MG tablet Take 1 tablet (20 mg) by mouth 2 times daily, Disp-20 tablet, R-0, Local Print      metoclopramide (REGLAN) 10 MG tablet Take 10 mg by mouth 4 times daily (before meals and nightly), Historical      !! traMADol (ULTRAM) 50 MG tablet Take 1-2 tablets ( mg) by mouth every 6 hours as needed for pain, Disp-15 tablet, R-0, Local Print      !! traMADol (ULTRAM) 50 MG tablet Take 1-2 tablets ( mg) by mouth every 6 hours as needed for pain, Disp-15 tablet, R-0, Local Print       !! - Potential duplicate medications found. Please discuss with provider.           No Known Allergies     Review of Systems   Constitutional: Negative for fever.   Skin: Positive for wound (under right breast).   All other systems reviewed and are negative.    A complete review of systems was performed with pertinent positives and negatives noted in the HPI, and all other systems negative.    Physical Exam   BP: 123/75  Pulse: 65  Temp: 98.5  F (36.9  C)  Resp: 18  SpO2: 99 %      Physical Exam  Gen:A&Ox3, no acute distress  HEENT:PERRL, no facial tenderness or wounds, head atraumatic  CV:RRR without murmurs  PULM:Clear to auscultation bilaterally  Abd:soft, nontender, nondistended. Bowel sounds present and normal  UE:No traumatic injuries, skin normal  LE:no traumatic injuries, skin normal  Skin: Right breast with draining abscess on the inferior medial aspect.       ED Course   5:51 PM   The patient was seen and examined by Paige Hernandez MD in [facility].       Procedures         No results found for this or any previous visit (from the past 24 hour(s)).  Medications - No data to display          Assessments & Plan (with Medical Decision Making)   20 yo F presenting with breast abscess. Pt is not lactating.   This is her second visit for breast pain and swelling, but now has spontaneous drainage from the area confirming he to have abscess rather than a solid mass.   She is on a course of cephalexin currently.   Culture obtained from the wound to assess for organisms resistant to cephalexin.   A referral was placed for follow up urgently with the breast center. It was informed to expect a call in the next day to schedule outpatient breast US and mamogram with possible drainage if recommended by radiology.     I have reviewed the nursing notes.    I have reviewed the findings, diagnosis, plan and need for follow up with the patient.    Discharge Medication List as of 3/22/2022  6:55 PM          Final diagnoses:   Breast abscess     IElly, am serving as a trained medical scribe to document services personally performed by Paige Hernandez MD based on the provider's statements to me on March 22, 2022.  This document has been checked and approved by the attending provider.    I, Paige Hernandez MD, was physically present and have reviewed and verified the accuracy of this note documented by Elly Membreno medical scribe.      Paige Hernandez MD    3/22/2022   [facility] EMERGENCY DEPARTMENT     Paige Hernandez MD  03/24/22 [id number]

## 2022-03-23 ENCOUNTER — APPOINTMENT (OUTPATIENT)
Dept: INTERPRETER SERVICES | Facility: CLINIC | Age: 22
End: 2022-03-23

## 2022-03-29 LAB
BACTERIA ABSC ANAEROBE+AEROBE CULT: ABNORMAL
BACTERIA ABSC ANAEROBE+AEROBE CULT: ABNORMAL